# Patient Record
(demographics unavailable — no encounter records)

---

## 2017-03-16 NOTE — UC
Lower Extremity/Ankle HPI





- HPI Summary


HPI Summary: 





8 yo male injured left knee and foot sledding


unable to bear wt


foot pain>knee pain











- History of Current Complaint


Chief Complaint: UCLowerExtremity


Stated Complaint: LEFT KNEE/ANKLE INJURY


Time Seen by Provider: 03/16/17 19:31


Hx Obtained From: Patient


Onset/Duration: Sudden Onset


Severity Initially: Moderate


Severity Currently: Moderate


Pain Intensity: 6


Pain Scale Used: 0-10 Numeric


Aggravating Factor(s): Standing, Ambulation


Alleviating Factor(s): Rest


Able to Bear Weight: No





- Allergies/Home Medications


Allergies/Adverse Reactions: 


 Allergies











Allergy/AdvReac Type Severity Reaction Status Date / Time


 


Chocolate Allergy  Congestion Verified 03/16/17 19:16














PMH/Surg Hx/FS Hx/Imm Hx


Previously Healthy: Yes


Respiratory History Of: Reports: Asthma - As baby





- Surgical History


Surgical History: None





- Family History


Known Family History: Positive: Other - dyslipidemia


   Negative: Cardiac Disease, Hypertension, Diabetes





- Social History


Substance Use Type: None


Smoking Status (MU): Never Smoked Tobacco


Household Exposure Type: Cigarettes





- Immunization History


Most Recent Influenza Vaccination: None


Vaccination Up to Date: Yes





Review of Systems


Constitutional: Negative


Skin: Bruising


Eyes: Negative


ENT: Negative


Respiratory: Negative


Cardiovascular: Negative


Gastrointestinal: Negative


Genitourinary: Negative


Motor: Negative


Neurovascular: Negative


Musculoskeletal: Arthralgia


Neurological: Negative


Psychological: Negative


All Other Systems Reviewed And Are Negative: Yes





Physical Exam


Triage Information Reviewed: Yes


Appearance: Well-Appearing, No Pain Distress, Well-Nourished


Vital Signs: 


 Initial Vital Signs











Temp  99.1 F   03/16/17 19:17


 


Pulse  89   03/16/17 19:17


 


Resp  20   03/16/17 19:17


 


BP  105/59   03/16/17 19:17


 


Pulse Ox  98   03/16/17 19:17











Eyes: Positive: Conjunctiva Clear


ENT: Positive: Hearing grossly normal.  Negative: Nasal congestion, Nasal 

drainage, Trismus, Muffled/hoarse voice


Neck: Positive: Supple, Nontender


Respiratory: Positive: Lungs clear, Normal breath sounds, No respiratory 

distress, No accessory muscle use


Cardiovascular: Positive: RRR, No Murmur


Abdomen Description: Positive: Nontender, No Organomegaly, Soft


Musculoskeletal: Positive: Other: - see images


Neurological: Positive: Alert


Psychological Exam: Normal


Skin Exam: Normal





Lower Extremity Course/Dx





- Differential Dx/Diagnosis


Provider Diagnoses: left knee and foot contusion





Discharge





- Discharge Plan


Condition: Stable


Disposition: HOME


Patient Education Materials:  Foot Contusion (ED)


Forms:  *Physical Education Release


Referrals: 


Johnny Vasquez MD [Primary Care Provider] - 6 Days (if not better)


Additional Instructions: 


rest


elevate


ice


ace


ibuprofen





get rechecked next week if not better





Images


Feet (Multiple View): 


  __________________________














  __________________________





 1 - tender/swollen/ecchymotic midfoot.  ankle not tender





Front/Back of Body, Lg (Mono): 


  __________________________














  __________________________





 1 - tender patella and medial knee.  no effusion

## 2017-03-16 NOTE — RAD
INDICATION: Left knee injury.



TECHNIQUE: 2 views of the left knee were obtained.

  

FINDINGS:  The bones are normal alignment. No joint effusion or fracture is seen.  Joint

spaces appear maintained.



IMPRESSION:  NO EVIDENCE FOR FRACTURE.

## 2017-03-16 NOTE — RAD
INDICATION:  Left foot injury.



TECHNIQUE: 2 views of the left foot were obtained.



FINDINGS:  The bones are normal alignment. No fracture is seen. Joint spaces appear

maintained.



IMPRESSION:  NO EVIDENCE FOR FRACTURE.

## 2017-06-27 NOTE — UC
Throat Pain/Nasal Lyndon HPI





- HPI Summary


HPI Summary: 





FEVER 103F, SORE THROAT, NAUSEA. SEEN LAST NIGHT AT [facility], GIVEN ZOFRAN, 

NO RAPIS STREP TEST DONE. 





- History of Current Complaint


Chief Complaint: UCGeneralIllness


Stated Complaint: 103.8 FEVER W/TYLENOL,SORE THROAT


Time Seen by Provider: 06/27/17 17:52


Hx Obtained From: Patient, Family/Caretaker


Onset/Duration: Gradual Onset, Lasting Days, Still Present


Severity: Moderate


Cough: None


Associated Signs & Symptoms: Positive: Hoarseness, Fever





- Epiglottits Risk Factors


Epiglottis Risk Factors: Negative





- Allergies/Home Medications


Allergies/Adverse Reactions: 


 Allergies











Allergy/AdvReac Type Severity Reaction Status Date / Time


 


Chocolate Allergy  Congestion Verified 06/27/17 17:09














PMH/Surg Hx/FS Hx/Imm Hx


Previously Healthy: Yes





- Surgical History


Surgical History: None





- Family History


Known Family History: Positive: Other - dyslipidemia


   Negative: Cardiac Disease, Hypertension, Diabetes





- Social History


Occupation: Student


Lives: With Family


Substance Use Type: None


Smoking Status (MU): Never Smoked Tobacco


Household Exposure Type: Cigarettes





- Immunization History


Most Recent Influenza Vaccination: None


Vaccination Up to Date: Yes





Review of Systems


Constitutional: Fever, Chills


Skin: Negative


Eyes: Negative


ENT: Sore Throat


Respiratory: Negative


Cardiovascular: Negative


Gastrointestinal: Negative


Genitourinary: Negative


Motor: Negative


Neurovascular: Negative


Musculoskeletal: Negative


Neurological: Negative


Psychological: Negative


All Other Systems Reviewed And Are Negative: Yes





Physical Exam


Triage Information Reviewed: Yes


Appearance: Well-Appearing, No Pain Distress, Well-Nourished


Vital Signs: 


 Initial Vital Signs











Temp  101.1 F   06/27/17 17:05


 


Pulse  130   06/27/17 17:05


 


Resp  20   06/27/17 17:05


 


BP  112/51   06/27/17 17:05


 


Pulse Ox  99   06/27/17 17:05











Vital Signs Reviewed: Yes


Eye Exam: Normal


ENT: Positive: Hearing grossly normal, Pharyngeal erythema, TM dull, Tonsillar 

swelling, Other: - PALATIAL PETICHIAE


Dental Exam: Normal


Neck exam: Normal


Neck: Positive: Supple, Nontender, No Lymphadenopathy


Respiratory Exam: Normal


Respiratory: Positive: Chest non-tender, Lungs clear


Cardiovascular Exam: Normal


Cardiovascular: Positive: RRR


Abdominal Exam: Normal


Musculoskeletal Exam: Normal


Musculoskeletal: Positive: Strength Intact


Neurological Exam: Normal


Psychological Exam: Normal


Skin Exam: Normal





Throat Pain/Nasal Course/Dx





- Differential Dx/Diagnosis


Differential Diagnosis/HQI/PQRI: Pharyngitis, Sinusitis, Tonsillitis, URI


Provider Diagnoses: TONSILLITIS





Discharge





- Discharge Plan


Condition: Stable


Disposition: HOME


Prescriptions: 


Amoxicillin SUSP* [Amoxicillin 400 MG/5 ML SUSP*] 400 mg PO TID #150 ml


Patient Education Materials:  Tonsillitis in Children (ED)


Referrals: 


[facility] [Outside]


Johnny Vasquez MD [Primary Care Provider] -

## 2017-07-27 NOTE — UC
Throat Pain/Nasal Lyndon HPI





- HPI Summary


HPI Summary: 





ST, low appetite, fever, HA, malaise starting last night. Today doesn't want to 

get out of bed.





- History of Current Complaint


Chief Complaint: UCRespiratory


Stated Complaint: HEAD,STOMACH ACHE,ST,FEVER


Time Seen by Provider: 07/27/17 16:34


Hx Obtained From: Patient, Family/Caretaker


Onset/Duration: Gradual Onset, Lasting Hours


Severity: Moderate


Cough: None


Associated Signs & Symptoms: Positive: Fever





- Allergies/Home Medications


Allergies/Adverse Reactions: 


 Allergies











Allergy/AdvReac Type Severity Reaction Status Date / Time


 


Chocolate Allergy  Congestion Verified 07/27/17 16:09











Home Medications: 


 Home Medications





Ibuprofen [Ibuprofen Silvino Strength] 2 tab PO PRN 07/27/17 [History]











PMH/Surg Hx/FS Hx/Imm Hx


Previously Healthy: Yes





- Surgical History


Surgical History: None





- Family History


Known Family History: Positive: Other - dyslipidemia


   Negative: Cardiac Disease, Hypertension, Diabetes





- Social History


Occupation: Student


Lives: With Family


Alcohol Use: None


Substance Use Type: None


Smoking Status (MU): Never Smoked Tobacco


Household Exposure Type: Cigarettes





- Immunization History


Most Recent Influenza Vaccination: None


Vaccination Up to Date: Yes





Review of Systems


Constitutional: Fever, Chills, Fatigue


Skin: Negative


Eyes: Negative


ENT: Sore Throat


Respiratory: Negative


Cardiovascular: Negative


Gastrointestinal: Abdominal Pain


Genitourinary: Negative


Motor: Negative


Neurovascular: Negative


Musculoskeletal: Negative


Neurological: Negative


Psychological: Negative


All Other Systems Reviewed And Are Negative: Yes





Physical Exam


Triage Information Reviewed: Yes


Appearance: Pain Distress - lying on table under blanket


Vital Signs: 


 Initial Vital Signs











Temp  100.8 F   07/27/17 16:05


 


Pulse  130   07/27/17 16:05


 


Resp  20   07/27/17 16:05


 


BP  101/57   07/27/17 16:05


 


Pulse Ox  97   07/27/17 16:05











Vital Signs Reviewed: Yes


Eye Exam: Normal


Eyes: Positive: Conjunctiva Clear


ENT: Positive: Hearing grossly normal, Pharyngeal erythema, TMs normal.  

Negative: Tonsillar exudate


Dental Exam: Normal


Neck: Positive: Enlarged Nodes @ - tonsillar


Respiratory Exam: Normal


Respiratory: Positive: Chest non-tender, Lungs clear, Normal breath sounds, No 

respiratory distress, No accessory muscle use


Cardiovascular: Positive: No Murmur, Tachycardia


Musculoskeletal Exam: Normal


Neurological Exam: Normal


Neurological: Positive: Alert


Psychological Exam: Normal


Skin Exam: Normal





Throat Pain/Nasal Course/Dx





- Differential Dx/Diagnosis


Provider Diagnoses: strep throat





Discharge





- Discharge Plan


Condition: Stable


Disposition: HOME


Prescriptions: 


Amoxicillin [Amoxicillin 250 MG CHEWABLE-] 500 mg PO BID #40 tab.chew


Patient Education Materials:  Strep Throat (ED)


Referrals: 


Johnny Vasquez MD [Primary Care Provider] - 


Additional Instructions: 


Please return here or see your pediatrician if there is not clear improvement 

over the next 3 days.